# Patient Record
Sex: FEMALE | Race: BLACK OR AFRICAN AMERICAN
[De-identification: names, ages, dates, MRNs, and addresses within clinical notes are randomized per-mention and may not be internally consistent; named-entity substitution may affect disease eponyms.]

---

## 2018-04-22 ENCOUNTER — HOSPITAL ENCOUNTER (EMERGENCY)
Dept: HOSPITAL 72 - EMR | Age: 53
Discharge: HOME | End: 2018-04-22
Payer: COMMERCIAL

## 2018-04-22 VITALS — DIASTOLIC BLOOD PRESSURE: 104 MMHG | SYSTOLIC BLOOD PRESSURE: 205 MMHG

## 2018-04-22 VITALS — WEIGHT: 175 LBS | HEIGHT: 65 IN | BODY MASS INDEX: 29.16 KG/M2

## 2018-04-22 VITALS — DIASTOLIC BLOOD PRESSURE: 100 MMHG | SYSTOLIC BLOOD PRESSURE: 200 MMHG

## 2018-04-22 DIAGNOSIS — Z76.0: Primary | ICD-10-CM

## 2018-04-22 DIAGNOSIS — I10: ICD-10-CM

## 2018-04-22 PROCEDURE — 99284 EMERGENCY DEPT VISIT MOD MDM: CPT

## 2018-04-22 NOTE — EMERGENCY ROOM REPORT
History of Present Illness


General


Chief Complaint:  Medication Refill





Present Illness


HPI


52-year-old female patient presents ER requesting medication refill for her 

hypertension medication.  Patient reports she has not taken her medication for 

a 'few days".  Patient denies acute symptoms ER currently.  Patient notes chest 

pain, shortness of breath, vision changes, abdominal pain.  denies neck pain, 

calf Pain, cough.


Patient reports she takes amlodipine 10 mg, and aspirin 20 mg.  Patient reports 

she is seen by a outpatient clinic but they're closed today and was unable to 

get a refill from them.


Patient reports drinking caffeine, patient reports stress at home, patient 

reports high fat and salt diet.


Allergies:  


Coded Allergies:  


     No Known Allergies (Unverified , 10/1/16)





Patient History


Past Medical History:  see triage record


Reviewed Nursing Documentation:  PMH: Agreed; PSxH: Agreed





Nursing Documentation-PMH


Hx Hypertension:  Yes





Review of Systems


All Other Systems:  negative except mentioned in HPI





Physical Exam





Vital Signs








  Date Time  Temp Pulse Resp B/P (MAP) Pulse Ox O2 Delivery O2 Flow Rate FiO2


 


4/22/18 13:35 98.1 76 20 205/104 99 Room Air  





 98.1       








Sp02 EP Interpretation:  reviewed, normal


General Appearance:  well appearing, no apparent distress, alert, GCS 15, non-

toxic


Head:  normocephalic, atraumatic


Eyes:  bilateral eye normal inspection, bilateral eye PERRL


ENT:  hearing grossly normal, normal pharynx, no angioedema, normal voice, 

uvula midline, moist mucus membranes


Neck:  full range of motion


Respiratory:  lungs clear, normal breath sounds, no rhonchi, no respiratory 

distress, no accessory muscle use, no wheezing, speaking full sentences


Cardiovascular #1:  regular rate, rhythm, no edema


Musculoskeletal:  back normal, digits/nails normal, gait/station normal, normal 

range of motion, non-tender


Neurologic:  alert, oriented x3, responsive, motor strength/tone normal, 

sensory intact


Psychiatric:  mood/affect normal


Skin:  no rash





Medical Decision Making


PA Attestation


Dr. Sidhu is my supervising Physician whom patient management has been 

discussed with.


Diagnostic Impression:  


 Primary Impression:  


 Encounter for medication refill


 Additional Impression:  


 History of hypertension


ER Course


Pt. presents to the ED requesting prescription refill.





Multiple differentials were considered.





Vital signs: are WNL, pt. is afebrile. blood pressure elevated. Will provide 

medication ER and prescription ago home with.





ORDERS: 


PE benign, lungs clear to auscultation.


Patient denies acute complaints in ER currently.


provided with single dose of hypertension medication ER. Will continue to 

monitor patient blood pressure.





Patient resting comfortably in no acute distress, talking without difficulty, 

smiling and laughing.


Informed patient ER cannot provide refills in the future; followup, management 

and prescription of long-term medications must be performed by primary care 

provider.


instructed patient to follow with primary care and requests referral to 

cardiology as needed.


Provided patient with instructions for managing hypertension. patient 

instructed on DASH diet. Avoid stress at home, avoid excessive salt intake, 

avoid fatty foods.


patient reports understanding and agreement treatment plan.





Blood pressure rechecked, blood pressure still elevated. Consult with Dr. Sidhu

, patient ok for discharge if no acute complaints. Patient has no acute 

complaints, patient ok for discharge home and outpatient treatment.


Return to ER immediately for new or worsening symptoms including but not 

limited to chest pain, shortness of breath, headache, vision changes, abdominal 

pain.





DISCHARGE: 


Rx provided for amlodipine


Rx provided for benazepril





At this time pt is stable for d/c to home.  Patient is resting comfortably, in 

no acute distress, nontoxic appearing, talking without difficulty.


Patient to take medications as instructed


Will provide with patient care instructions and any necessary prescriptions.


Care plan and follow-up instructions provided.  


Patient instructed to follow-up with primary care provider in 3 - 5 days.


Patient questions asked and answered.


Patient reports understanding and agreement to treatment plan.


ER precautions given. Patient instructed to return to ER immediately for any 

new or worsening of symptoms including but not limited to increasing SOB, 

persistent fever.





Last Vital Signs








  Date Time  Temp Pulse Resp B/P (MAP) Pulse Ox O2 Delivery O2 Flow Rate FiO2


 


4/22/18 13:44 98.1 76 20 205/104 99 Room Air  





 98.1       








Disposition:  HOME, SELF-CARE


Condition:  Stable


Scripts


Benazepril Hcl* (BENAZEPRIL HCL*) 20 Mg Tablet


20 MG ORAL EVERY 12 HOURS, #24 TAB


   Prov: Cruzito George         4/22/18 


Amlodipine Besylate* (AMLODIPINE BESYLATE*) 10 Mg Tablet


10 MG ORAL EVERY 12 HOURS, #24 TAB


   Prov: Cruzito George.MAURICE         4/22/18


Patient Instructions:  DASH Eating Plan, Hypertension, Easy-to-Read, Managing 

Your High Blood Pressure, Medicine Refill at the Emergency Department





Additional Instructions:  


Followup with primary care provider in 3 -5 days.


Take medications as directed. 


Patient questions asked and answered.


ER precautions given, patient instructed to return to ER immediately for any 

new or worsening of symptoms.











Cruzito George Apr 22, 2018 13:59